# Patient Record
Sex: FEMALE | Race: ASIAN | NOT HISPANIC OR LATINO | Employment: FULL TIME | URBAN - METROPOLITAN AREA
[De-identification: names, ages, dates, MRNs, and addresses within clinical notes are randomized per-mention and may not be internally consistent; named-entity substitution may affect disease eponyms.]

---

## 2017-05-09 DIAGNOSIS — Z12.31 ENCOUNTER FOR SCREENING MAMMOGRAM FOR MALIGNANT NEOPLASM OF BREAST: ICD-10-CM

## 2017-11-01 ENCOUNTER — ALLSCRIPTS OFFICE VISIT (OUTPATIENT)
Dept: OTHER | Facility: OTHER | Age: 57
End: 2017-11-01

## 2017-11-01 DIAGNOSIS — E55.9 VITAMIN D DEFICIENCY: ICD-10-CM

## 2017-11-01 DIAGNOSIS — E78.5 HYPERLIPIDEMIA: ICD-10-CM

## 2017-12-15 ENCOUNTER — LAB CONVERSION - ENCOUNTER (OUTPATIENT)
Dept: OTHER | Facility: OTHER | Age: 57
End: 2017-12-15

## 2017-12-15 LAB
25(OH)D3 SERPL-MCNC: 24 NG/ML (ref 30–100)
A/G RATIO (HISTORICAL): 1.8 (CALC) (ref 1–2.5)
ALBUMIN SERPL BCP-MCNC: 4.3 G/DL (ref 3.6–5.1)
ALP SERPL-CCNC: 70 U/L (ref 33–130)
ALT SERPL W P-5'-P-CCNC: 13 U/L (ref 6–29)
AST SERPL W P-5'-P-CCNC: 14 U/L (ref 10–35)
BACTERIA UR QL AUTO: NORMAL /HPF
BASOPHILS # BLD AUTO: 1 %
BASOPHILS # BLD AUTO: 49 CELLS/UL (ref 0–200)
BILIRUB SERPL-MCNC: 0.5 MG/DL (ref 0.2–1.2)
BILIRUB UR QL STRIP: NEGATIVE
BUN SERPL-MCNC: 12 MG/DL (ref 7–25)
BUN/CREA RATIO (HISTORICAL): ABNORMAL (CALC) (ref 6–22)
CALCIUM SERPL-MCNC: 9.5 MG/DL (ref 8.6–10.4)
CHLORIDE SERPL-SCNC: 102 MMOL/L (ref 98–110)
CHOLEST SERPL-MCNC: 187 MG/DL
CHOLEST/HDLC SERPL: 2.6 (CALC)
CO2 SERPL-SCNC: 27 MMOL/L (ref 20–31)
COLOR UR: YELLOW
COMMENT (HISTORICAL): CLEAR
CREAT SERPL-MCNC: 0.7 MG/DL (ref 0.5–1.05)
CREATININE, RANDOM URINE (HISTORICAL): 20 MG/DL (ref 20–320)
DEPRECATED RDW RBC AUTO: 13.2 % (ref 11–15)
EGFR AFRICAN AMERICAN (HISTORICAL): 111 ML/MIN/1.73M2
EGFR-AMERICAN CALC (HISTORICAL): 96 ML/MIN/1.73M2
EOSINOPHIL # BLD AUTO: 113 CELLS/UL (ref 15–500)
EOSINOPHIL # BLD AUTO: 2.3 %
FECAL OCCULT BLOOD DIAGNOSTIC (HISTORICAL): NEGATIVE
GAMMA GLOBULIN (HISTORICAL): 2.4 G/DL (CALC) (ref 1.9–3.7)
GLUCOSE (HISTORICAL): 117 MG/DL (ref 65–99)
GLUCOSE (HISTORICAL): NEGATIVE
HBA1C MFR BLD HPLC: 6.6 % OF TOTAL HGB
HCT VFR BLD AUTO: 38.3 % (ref 35–45)
HDLC SERPL-MCNC: 73 MG/DL
HGB BLD-MCNC: 13 G/DL (ref 11.7–15.5)
HYALINE CASTS #/AREA URNS LPF: NORMAL /LPF
KETONES UR STRIP-MCNC: NEGATIVE MG/DL
LDL CHOLESTEROL (HISTORICAL): 94 MG/DL (CALC)
LEUKOCYTE ESTERASE UR QL STRIP: NEGATIVE
LYMPHOCYTES # BLD AUTO: 1872 CELLS/UL (ref 850–3900)
LYMPHOCYTES # BLD AUTO: 38.2 %
MAGNESIUM, UR (HISTORICAL): <0.2 MG/DL
MCH RBC QN AUTO: 30 PG (ref 27–33)
MCHC RBC AUTO-ENTMCNC: 33.9 G/DL (ref 32–36)
MCV RBC AUTO: 88.2 FL (ref 80–100)
MICROALBUMIN/CREATININE RATIO (HISTORICAL): NORMAL MCG/MG CREAT
MONOCYTES # BLD AUTO: 250 CELLS/UL (ref 200–950)
MONOCYTES (HISTORICAL): 5.1 %
NEUTROPHILS # BLD AUTO: 2617 CELLS/UL (ref 1500–7800)
NEUTROPHILS # BLD AUTO: 53.4 %
NITRITE UR QL STRIP: NEGATIVE
NON-HDL-CHOL (CHOL-HDL) (HISTORICAL): 114 MG/DL (CALC)
PH UR STRIP.AUTO: 7 [PH] (ref 5–8)
PLATELET # BLD AUTO: 273 THOUSAND/UL (ref 140–400)
PMV BLD AUTO: 11 FL (ref 7.5–12.5)
POTASSIUM SERPL-SCNC: 4 MMOL/L (ref 3.5–5.3)
PROT UR STRIP-MCNC: NEGATIVE MG/DL
RBC # BLD AUTO: 4.34 MILLION/UL (ref 3.8–5.1)
RBC (HISTORICAL): NORMAL /HPF
SODIUM SERPL-SCNC: 139 MMOL/L (ref 135–146)
SP GR UR STRIP.AUTO: 1 (ref 1–1.03)
SQUAMOUS EPITHELIAL CELLS (HISTORICAL): NORMAL /HPF
TOTAL PROTEIN (HISTORICAL): 6.7 G/DL (ref 6.1–8.1)
TRIGL SERPL-MCNC: 103 MG/DL
TSH SERPL DL<=0.05 MIU/L-ACNC: 3.15 MIU/L (ref 0.4–4.5)
WBC # BLD AUTO: 4.9 THOUSAND/UL (ref 3.8–10.8)
WBC # BLD AUTO: NORMAL /HPF

## 2018-01-10 NOTE — PROGRESS NOTES
Assessment    1  Annual physical exam (V70 0) (Z00 00)   2  Diabetes mellitus type II, controlled (250 00) (E11 9)   3  Hyperlipidemia (272 4) (E78 5)   4  Impaired fasting glucose (790 21) (R73 01)   5  Vitamin D deficiency (268 9) (E55 9)   6  Screening for genitourinary condition (V81 6) (Z13 89)    Plan  Diabetes mellitus type II, controlled, Vitamin D deficiency    · (1) HEMOGLOBIN A1C; Status:Active; Requested for:01Nov2017;   Hyperlipidemia, Vitamin D deficiency    · (1) LIPID PANEL FASTING W DIRECT LDL REFLEX; Status:Active; Requested  for:01Nov2017;    · (1) TSH WITH FT4 REFLEX; Status:Active; Requested for:01Nov2017;   Screening for genitourinary condition    · (1) URINALYSIS (will reflex a microscopy if leukocytes, occult blood, protein or nitrites  are not within normal limits); Status:Active; Requested for:01Nov2017;   Vitamin D deficiency    · (1) CBC/PLT/DIFF; Status:Active; Requested for:01Nov2017;    · (1) COMPREHENSIVE METABOLIC PANEL; Status:Active; Requested for:01Nov2017;    · (1) MICROALBUMIN CREATININE RATIO, RANDOM URINE; Status:Active; Requested  for:01Nov2017;    · (1) VITAMIN D 25-HYDROXY; Status:Active; Requested for:01Nov2017;     Discussion/Summary  healthy adult female Currently, she has an inadequate exercise regimen  cervical cancer screening is current cervical cancer screening is managed by [facility] Breast cancer screening: mammogram is current  Colorectal cancer screening: colonoscopy is needed every ten years and colorectal cancer screening is managed by Dr Sánchez Dias  Osteoporosis screening: bone mineral density testing is not indicated  The risks and benefits of immunizations were discussed  Advice and education were given regarding calcium supplements and vitamin D supplements  F/u in 1 yr  Chief Complaint  pt is here for annual physical      History of Present Illness  HM, Adult Female: The patient is being seen for a health maintenance evaluation  General Health:  The patient's health since the last visit is described as good  She has regular dental visits  She denies vision problems  She denies hearing loss  Immunizations status: up to date  Lifestyle:  She consumes a diverse and healthy diet  She does not have any weight concerns  She exercises regularly  She does not use tobacco  She denies alcohol use  Reproductive health:  she reports normal menses  Screening:      Review of Systems    Constitutional: not feeling poorly and not feeling tired  Eyes: eyesight problems  ENT: no nosebleeds, no sore throat and no hearing loss  Cardiovascular: no chest pain and no palpitations  Respiratory: no shortness of breath and no cough  Gastrointestinal: no abdominal pain, no nausea and no diarrhea  Genitourinary: no dysuria  Musculoskeletal: no arthralgias, no joint swelling and no joint stiffness  Neurological: no dizziness  Psychiatric: no anxiety and no depression  Hematologic/Lymphatic: no tendency for easy bleeding  Active Problems    1  Acute pharyngitis (462) (J02 9)   2  Colon cancer screening (V76 51) (Z12 11)   3  Diabetes mellitus type II, controlled (250 00) (E11 9)   4  Encounter for screening mammogram for breast cancer (V76 12) (Z12 31)   5  Hyperlipidemia (272 4) (E78 5)   6  Impaired fasting glucose (790 21) (R73 01)   7  Need for shingles vaccine (V04 89) (Z23)   8   Vitamin D deficiency (268 9) (E55 9)    Past Medical History    · History of Acute upper respiratory infection (465 9) (J06 9)   · History of Annual physical exam (V70 0) (Z00 00)   · History of Colonoscopy (Fiberoptic) Screening   · History of Diabetes mellitus type 2, uncontrolled (250 02) (E11 65)   · History of Encounter for screening colonoscopy (V76 51) (Z12 11)   · History of acute pharyngitis (V12 69) (Z87 09)   · History of dermatitis (V13 3) (Z87 2)   · History of migraine (V12 49) (Z86 69)   · Need for prophylactic vaccination and inoculation against influenza (V04 81) (Z23)   · History of Need for prophylactic vaccination and inoculation against influenza (V04 81)  (Z23)   · History of Vitamin D deficiency (268 9) (E55 9)    Surgical History    · History of Appendectomy    Family History  Mother    · Family history of Arthritis (V17 7)   · Denied: Family history of Colon cancer   · Denied: Family history of colitis   · Denied: Family history of colonic polyps   · Denied: Family history of Crohn's disease   · Denied: Family history of liver disease  Father    · Denied: Family history of Colon cancer   · Denied: Family history of colitis   · Denied: Family history of colonic polyps   · Denied: Family history of Crohn's disease   · Denied: Family history of liver disease   · Family history of Parkinson Disease    Social History    · Never A Smoker   · Wine Consumption (___ Glasses/Day)   · 1 glass/week    Current Meds   1  Atorvastatin Calcium 10 MG Oral Tablet; Take 1 tablet daily; Therapy: 12QDW3463 to (Last Rx:11Mut9638)  Requested for: 01Kpn2550 Ordered   2  Daily Multivitamin TABS; TAKE 1 TABLET DAILY; Therapy: (Recorded:31Oct2016) to Recorded   3  MetFORMIN HCl - 500 MG Oral Tablet; Take 1 tablet twice daily; Therapy: 93DNP0920 to (Evaluate:82Twa1388)  Requested for: 41MMH0089; Last   Rx:48Xqn4729 Ordered   4  OneTouch Delica Lancets Fine Miscellaneous; USE 3 TIMES A DAY; Therapy: 06CUG8541 to (Last Rx:72Zxl1447)  Requested for: 29UZP6285 Ordered   5  OneTouch Ultra Blue In [employer]; TEST three times a day; Therapy: 16VNV9220 to (Last Rx:11Uqi5195)  Requested for: 99Agt0609 Ordered    Allergies    1  No Known Drug Allergies    Vitals   Recorded: 04QAU7213 09:35AM   Heart Rate 64   Systolic 786   Diastolic 80   Height 5 ft 3 in   Weight 144 lb 4 00 oz   BMI Calculated 25 55   BSA Calculated 1 69   O2 Saturation 99     Physical Exam    Constitutional   General appearance: No acute distress, well appearing and well nourished      Head and Face   Head and face: Normal  Ears, Nose, Mouth, and Throat   External inspection of ears and nose: Normal     Otoscopic examination: Tympanic membranes translucent with normal light reflex  Canals patent without erythema  Oropharynx: Normal with no erythema, edema, exudate or lesions  Neck   Neck: Supple, symmetric, trachea midline, no masses  Thyroid: Normal, no thyromegaly  Pulmonary   Respiratory effort: No increased work of breathing or signs of respiratory distress  Auscultation of lungs: Clear to auscultation  Cardiovascular   Auscultation of heart: Normal rate and rhythm, normal S1 and S2, no murmurs  Carotid pulses: 2+ bilaterally  Pedal pulses: 2+ bilaterally  Examination of extremities for edema and/or varicosities: Normal     Abdomen   Abdomen: Non-tender, no masses  Genitourinary gyn  Lymphatic   Palpation of lymph nodes in neck: No lymphadenopathy  Palpation of lymph nodes in axillae: No lymphadenopathy  Musculoskeletal   Gait and station: Normal     Digits and nails: Normal without clubbing or cyanosis  Joints, bones, and muscles: Normal     Stability: Normal     Muscle strength/tone: Normal     Neurologic   Cranial nerves: Cranial nerves II-XII intact  Cortical function: Normal mental status  Reflexes: 2+ and symmetric  Coordination: Normal finger to nose and heel to shin  Psychiatric   Judgment and insight: Normal     Orientation to person, place, and time: Normal     Recent and remote memory: Intact  Health Management  Colon cancer screening   COLONOSCOPY (GI, SURG); every 10 years; Last 28GWN3114; Next Due: 79LCI0309; Active    Future Appointments    Date/Time Provider Specialty Site   11/02/2018 09:00 AM CORNELIUS Schultz   Internal Medicine [facility]     Signatures   Electronically signed by : CORNELIUS Ramirez ; Nov 11 2017 10:59PM EST                       (Author)

## 2018-01-13 VITALS
WEIGHT: 144.25 LBS | DIASTOLIC BLOOD PRESSURE: 80 MMHG | BODY MASS INDEX: 25.56 KG/M2 | HEART RATE: 64 BPM | SYSTOLIC BLOOD PRESSURE: 128 MMHG | OXYGEN SATURATION: 99 % | HEIGHT: 63 IN

## 2018-01-14 NOTE — PROGRESS NOTES
Chief Complaint  pt was in for Zostavax vaccine was administered in [facility] [id number]  Lot [id number]  exp 02/17/2017      Active Problems    1  Acute upper respiratory infection (465 9) (J06 9)   2  Colonoscopy (Fiberoptic) Screening   3  Dermatitis (692 9) (L30 9)   4  Encounter for screening colonoscopy (V76 51) (Z12 11)   5  Hyperlipidemia (272 4) (E78 5)   6  Impaired fasting glucose (790 21) (R73 01)   7  Migraine headache (346 90) (G43 909)   8  Vitamin D deficiency (268 9) (E55 9)    Current Meds   1  Atorvastatin Calcium 10 MG Oral Tablet; Take 1 tablet daily; Therapy: 56RDT6790 to (Last Rx:16Oct2015)  Requested for: 16Oct2015 Ordered   2  DrRx Zithromax Z-Tho 250 MG #6 pill pack; TAKE TWO PILLS FIRST DAY AND THEN   ONE FOR THE NEXT FOUR DAYS AFTER; Therapy: 68VXS5044 to (Last Rx:29Vtw3895) Ordered   3  Guaifenesin-Codeine 100-10 MG/5ML SYRP; one teaspoonsful twice a day prn; Therapy: 11MVS0498 to (Last Rx:89Viu7401) Ordered   4  MetFORMIN HCl - 500 MG Oral Tablet; Take one tablet daily with breakfast;   Therapy: 11VXR3358 to (Last Rx:16Oct2015)  Requested for: 07NYF0057 Ordered   5  Naratriptan HCl - 1 MG Oral Tablet; TAKE 1 TABLET AT ONSET OF HEADACHE, MAY   REPEAT ONCE IN 4 HOURS; Therapy: 81QMB3227 to ([place] [facility])  Requested for: 48THP9869; Last   Rx:17Lon6952 Ordered   6  Triamcinolone Acetonide 0 1 % External Lotion; APPLY SPARINGLY AND MASSAGE IN   TWICE DAILY; Therapy: 56USZ9770 to (Last Rx:14Nov2014)  Requested for: 87YWP6355 Ordered   7  Vitamin D3 46640 UNIT Oral Capsule; one pill  q week x 2 months; Therapy: 28MFJ7023 to (Last Rx:10Mar2014)  Requested for: 71VJH7739 Ordered    Allergies    1  No Known Drug Allergies    Plan  Need for shingles vaccine    · Zoster (Zostavax); INJECT 0 65  ML Intramuscular;  To Be Done: 36PFL7342    Signatures   Electronically signed by : CORNELIUS Freitas ; Aug  5 2016  9:48AM EST                       (Author)

## 2018-01-16 NOTE — PROGRESS NOTES
Assessment    1  Annual physical exam (V70 0) (Z00 00)   2  Impaired fasting glucose (790 21) (R73 01)   3  Hyperlipidemia (272 4) (E78 5)   4  Vitamin D deficiency (268 9) (E55 9)   5  Encounter for screening mammogram for breast cancer (V76 12) (Z12 31)    Plan  Encounter for screening colonoscopy    · Sofi Posada DO (Gastroenterology) Physician Referral  Consult  Status: Active   Requested for: 14KIT6279  Care Summary provided  : Yes  Encounter for screening mammogram for breast cancer    · COLONOSCOPY; Status:Active; Requested for:75Uuo2731;    · MAMMO SCREENING BILATERAL W 3D & CAD; Status:Hold For - Scheduling; Requested  for:61Ygf7483;   Hyperlipidemia    · (1) COMPREHENSIVE METABOLIC PANEL; Status:Active; Requested for:38Nka0964;    · (1) LIPID PANEL FASTING W DIRECT LDL REFLEX; Status:Active; Requested  for:21Qew8149;    · (1) VITAMIN D 25-HYDROXY; Status:Active; Requested for:85Avf2153;   Hyperlipidemia, Impaired fasting glucose    · (1) CBC/PLT/DIFF; Status:Active; Requested for:15Zsa2958;    · (1) TSH WITH FT4 REFLEX; Status:Active; Requested for:14Ybx0253;    · (1) URINALYSIS w URINE C/S REFLEX (will reflex a microscopy if leukocytes, occult  blood, or nitrites are not within normal limits); Status:Active; Requested for:80Bei8605;   Hyperlipidemia, Vitamin D deficiency    · (1) LIPID PANEL FASTING W DIRECT LDL REFLEX; Status:Active; Requested  for:92Rul3803;   Impaired fasting glucose, Vitamin D deficiency    · (1) HEMOGLOBIN A1C; Status:Active; Requested for:70Lot2633;   Vitamin D deficiency    · (1) CBC/PLT/DIFF; Status:Active; Requested for:21Goe2948;    · (1) COMPREHENSIVE METABOLIC PANEL; Status:Active; Requested for:11Rxa9013;    · (1) TSH WITH FT4 REFLEX; Status:Active; Requested for:13Saw7020;    · (1) URINALYSIS w URINE C/S REFLEX (will reflex a microscopy if leukocytes, occult  blood, or nitrites are not within normal limits); Status:Active;  Requested for:25Tso4062;    · (1) VITAMIN D 25-HYDROXY; Status:Active; Requested for:91Pbv4581;     Discussion/Summary  health maintenance visit Currently, she has an inadequate exercise regimen  cervical cancer screening is current Breast cancer screening: mammogram is current and breast cancer screening is managed by gyn  Colorectal cancer screening: colonoscopy has been ordered  Osteoporosis screening: bone mineral density testing is not indicated  Screening lab work includes see orders  The immunizations are up to date  Patient discussion: discussed with the patient  History of Present Illness  HM, Adult Female: The patient is being seen for a health maintenance evaluation  General Health: The patient's health since the last visit is described as good  Screening:      Review of Systems    Constitutional: not feeling poorly and not feeling tired  Eyes: no eyesight problems and no purulent discharge from the eyes  ENT: no nosebleeds and no nasal discharge  Cardiovascular: no chest pain and no palpitations  Respiratory: no cough and no shortness of breath during exertion  Active Problems    1  Acute upper respiratory infection (465 9) (J06 9)   2  Colonoscopy (Fiberoptic) Screening   3  Dermatitis (692 9) (L30 9)   4  Encounter for screening colonoscopy (V76 51) (Z12 11)   5  Encounter for screening mammogram for breast cancer (V76 12) (Z12 31)   6  Hyperlipidemia (272 4) (E78 5)   7  Impaired fasting glucose (790 21) (R73 01)   8  Migraine headache (346 90) (G43 909)   9  Need for shingles vaccine (V04 89) (Z23)   10   Vitamin D deficiency (268 9) (E55 9)    Past Medical History    · History of acute pharyngitis (V12 69) (Z87 09)   · Need for prophylactic vaccination and inoculation against influenza (V04 81) (Z23)   · History of Need for prophylactic vaccination and inoculation against influenza (V04 81)  (Z23)   · History of Vitamin D deficiency (268 9) (E55 9)    Surgical History    · History of Appendectomy    Family History  Mother    · Family history of Arthritis (V17 7)  Father    · Family history of Parkinson Disease    Social History    · Never A Smoker   · Wine Consumption (___ Glasses/Day)   · 1 glass/week    Current Meds   1  Atorvastatin Calcium 10 MG Oral Tablet; Take 1 tablet daily; Therapy: 64SMA7461 to (Last Rx:16Oct2015)  Requested for: 16Oct2015 Ordered   2  DrRx Zithromax Z-Tho 250 MG #6 pill pack; TAKE TWO PILLS FIRST DAY AND THEN   ONE FOR THE NEXT FOUR DAYS AFTER; Therapy: 16JNU8312 to (Last Rx:20Ddh4372) Ordered   3  Guaifenesin-Codeine 100-10 MG/5ML SYRP; one teaspoonsful twice a day prn; Therapy: 27RBW4078 to (Last Rx:77Eyn3713) Ordered   4  MetFORMIN HCl - 500 MG Oral Tablet; Take one tablet daily with breakfast;   Therapy: 38AVD2816 to (Last Rx:16Oct2015)  Requested for: 43HGP0024 Ordered   5  Naratriptan HCl - 1 MG Oral Tablet; TAKE 1 TABLET AT ONSET OF HEADACHE, MAY   REPEAT ONCE IN 4 HOURS; Therapy: 77NHQ2098 to (Nory Henry)  Requested for: 36SWK4099; Last   Rx:96Ove7304 Ordered   6  Triamcinolone Acetonide 0 1 % External Lotion; APPLY SPARINGLY AND MASSAGE IN   TWICE DAILY; Therapy: 65DHF4427 to (Last Rx:14Nov2014)  Requested for: 06NUN6888 Ordered   7  Vitamin D3 71684 UNIT Oral Capsule; one pill  q week x 2 months; Therapy: 39WVZ7763 to (Last Rx:10Mar2014)  Requested for: 71GRL6890 Ordered    Allergies    1  No Known Drug Allergies    Vitals   Recorded: 55YSH2696 26:62CR   Systolic 265   Diastolic 76   Heart Rate 75   Respiration 16   O2 Saturation 97   Height 5 ft 3 in   Weight 148 lb    BMI Calculated 26 22   BSA Calculated 1 71     Physical Exam    Constitutional   General appearance: No acute distress, well appearing and well nourished  Head and Face   Head and face: Normal     Ears, Nose, Mouth, and Throat   External inspection of ears and nose: Normal     Otoscopic examination: Tympanic membranes translucent with normal light reflex  Canals patent without erythema  Oropharynx: Normal with no erythema, edema, exudate or lesions  Neck   Neck: Supple, symmetric, trachea midline, no masses  Thyroid: Normal, no thyromegaly  Pulmonary   Respiratory effort: No increased work of breathing or signs of respiratory distress  Auscultation of lungs: Clear to auscultation  Cardiovascular   Auscultation of heart: Normal rate and rhythm, normal S1 and S2, no murmurs  Carotid pulses: 2+ bilaterally  Pedal pulses: 2+ bilaterally  Examination of extremities for edema and/or varicosities: Normal     Abdomen   Abdomen: Non-tender, no masses  Genitourinary gyn  Lymphatic   Palpation of lymph nodes in neck: No lymphadenopathy  Palpation of lymph nodes in axillae: No lymphadenopathy  Musculoskeletal   Gait and station: Normal     Digits and nails: Normal without clubbing or cyanosis  Joints, bones, and muscles: Normal     Stability: Normal     Muscle strength/tone: Normal     Neurologic   Cranial nerves: Cranial nerves II-XII intact  Cortical function: Normal mental status  Reflexes: 2+ and symmetric  Coordination: Normal finger to nose and heel to shin  Psychiatric   Judgment and insight: Normal     Orientation to person, place, and time: Normal     Recent and remote memory: Intact  Health Management  Encounter for screening colonoscopy   COLONOSCOPY; every 10 years; Next Due: 41EQQ1601; Overdue    Future Appointments    Date/Time Provider Specialty Site   08/17/2017 02:20 PM CORNELIUS Manuel   Internal Medicine [facility]     Signatures   Electronically signed by : CORNELIUS Bartholomew ; Aug 19 2016 11:42PM EST                       (Author)

## 2018-04-01 DIAGNOSIS — E11.9 TYPE 2 DIABETES MELLITUS WITHOUT COMPLICATION, WITHOUT LONG-TERM CURRENT USE OF INSULIN (HCC): Primary | ICD-10-CM

## 2018-04-09 ENCOUNTER — DOCUMENTATION (OUTPATIENT)
Dept: INTERNAL MEDICINE CLINIC | Facility: CLINIC | Age: 58
End: 2018-04-09

## 2018-04-18 ENCOUNTER — HOSPITAL ENCOUNTER (EMERGENCY)
Facility: HOSPITAL | Age: 58
Discharge: HOME | End: 2018-04-18
Attending: EMERGENCY MEDICINE
Payer: COMMERCIAL

## 2018-04-18 VITALS
BODY MASS INDEX: 25.69 KG/M2 | OXYGEN SATURATION: 97 % | RESPIRATION RATE: 18 BRPM | WEIGHT: 145 LBS | TEMPERATURE: 97.4 F | SYSTOLIC BLOOD PRESSURE: 135 MMHG | DIASTOLIC BLOOD PRESSURE: 63 MMHG | HEART RATE: 64 BPM

## 2018-04-18 DIAGNOSIS — R10.13 DYSPEPSIA: Primary | ICD-10-CM

## 2018-04-18 DIAGNOSIS — R10.12 LUQ PAIN: ICD-10-CM

## 2018-04-18 DIAGNOSIS — K29.70 GASTRITIS: ICD-10-CM

## 2018-04-18 LAB
ALBUMIN SERPL BCP-MCNC: 4.1 G/DL (ref 3.5–5)
ALP SERPL-CCNC: 75 U/L (ref 46–116)
ALT SERPL W P-5'-P-CCNC: 24 U/L (ref 12–78)
ANION GAP SERPL CALCULATED.3IONS-SCNC: 13 MMOL/L (ref 4–13)
AST SERPL W P-5'-P-CCNC: 30 U/L (ref 5–45)
BASOPHILS # BLD AUTO: 0.02 THOUSANDS/ΜL (ref 0–0.1)
BASOPHILS NFR BLD AUTO: 0 % (ref 0–1)
BILIRUB SERPL-MCNC: 0.47 MG/DL (ref 0.2–1)
BUN SERPL-MCNC: 14 MG/DL (ref 5–25)
CALCIUM SERPL-MCNC: 9.4 MG/DL (ref 8.3–10.1)
CHLORIDE SERPL-SCNC: 101 MMOL/L (ref 100–108)
CO2 SERPL-SCNC: 26 MMOL/L (ref 21–32)
CREAT SERPL-MCNC: 0.93 MG/DL (ref 0.6–1.3)
EOSINOPHIL # BLD AUTO: 0.01 THOUSAND/ΜL (ref 0–0.61)
EOSINOPHIL NFR BLD AUTO: 0 % (ref 0–6)
ERYTHROCYTE [DISTWIDTH] IN BLOOD BY AUTOMATED COUNT: 13.2 % (ref 11.6–15.1)
GFR SERPL CREATININE-BSD FRML MDRD: 68 ML/MIN/1.73SQ M
GLUCOSE SERPL-MCNC: 234 MG/DL (ref 65–140)
HCT VFR BLD AUTO: 38.1 % (ref 34.8–46.1)
HGB BLD-MCNC: 13 G/DL (ref 11.5–15.4)
LIPASE SERPL-CCNC: 122 U/L (ref 73–393)
LYMPHOCYTES # BLD AUTO: 1.24 THOUSANDS/ΜL (ref 0.6–4.47)
LYMPHOCYTES NFR BLD AUTO: 15 % (ref 14–44)
MCH RBC QN AUTO: 29.7 PG (ref 26.8–34.3)
MCHC RBC AUTO-ENTMCNC: 34.1 G/DL (ref 31.4–37.4)
MCV RBC AUTO: 87 FL (ref 82–98)
MONOCYTES # BLD AUTO: 0.22 THOUSAND/ΜL (ref 0.17–1.22)
MONOCYTES NFR BLD AUTO: 3 % (ref 4–12)
NEUTROPHILS # BLD AUTO: 6.74 THOUSANDS/ΜL (ref 1.85–7.62)
NEUTS SEG NFR BLD AUTO: 82 % (ref 43–75)
NRBC BLD AUTO-RTO: 0 /100 WBCS
PLATELET # BLD AUTO: 266 THOUSANDS/UL (ref 149–390)
PMV BLD AUTO: 10.6 FL (ref 8.9–12.7)
POTASSIUM SERPL-SCNC: 4.8 MMOL/L (ref 3.5–5.3)
PROT SERPL-MCNC: 7.9 G/DL (ref 6.4–8.2)
RBC # BLD AUTO: 4.37 MILLION/UL (ref 3.81–5.12)
SODIUM SERPL-SCNC: 140 MMOL/L (ref 136–145)
TROPONIN I SERPL-MCNC: <0.02 NG/ML
WBC # BLD AUTO: 8.23 THOUSAND/UL (ref 4.31–10.16)

## 2018-04-18 PROCEDURE — 96361 HYDRATE IV INFUSION ADD-ON: CPT

## 2018-04-18 PROCEDURE — 83690 ASSAY OF LIPASE: CPT | Performed by: EMERGENCY MEDICINE

## 2018-04-18 PROCEDURE — 36415 COLL VENOUS BLD VENIPUNCTURE: CPT | Performed by: EMERGENCY MEDICINE

## 2018-04-18 PROCEDURE — 84484 ASSAY OF TROPONIN QUANT: CPT | Performed by: EMERGENCY MEDICINE

## 2018-04-18 PROCEDURE — 96376 TX/PRO/DX INJ SAME DRUG ADON: CPT

## 2018-04-18 PROCEDURE — 80053 COMPREHEN METABOLIC PANEL: CPT | Performed by: EMERGENCY MEDICINE

## 2018-04-18 PROCEDURE — 96374 THER/PROPH/DIAG INJ IV PUSH: CPT

## 2018-04-18 PROCEDURE — 85025 COMPLETE CBC W/AUTO DIFF WBC: CPT | Performed by: EMERGENCY MEDICINE

## 2018-04-18 PROCEDURE — 99284 EMERGENCY DEPT VISIT MOD MDM: CPT

## 2018-04-18 PROCEDURE — 93005 ELECTROCARDIOGRAM TRACING: CPT | Performed by: EMERGENCY MEDICINE

## 2018-04-18 PROCEDURE — 96375 TX/PRO/DX INJ NEW DRUG ADDON: CPT

## 2018-04-18 RX ORDER — MAGNESIUM HYDROXIDE/ALUMINUM HYDROXICE/SIMETHICONE 120; 1200; 1200 MG/30ML; MG/30ML; MG/30ML
30 SUSPENSION ORAL ONCE
Status: COMPLETED | OUTPATIENT
Start: 2018-04-18 | End: 2018-04-18

## 2018-04-18 RX ORDER — DICYCLOMINE HCL 20 MG
20 TABLET ORAL ONCE
Status: COMPLETED | OUTPATIENT
Start: 2018-04-18 | End: 2018-04-18

## 2018-04-18 RX ORDER — ACETAMINOPHEN 325 MG/1
650 TABLET ORAL EVERY 6 HOURS PRN
Qty: 30 TABLET | Refills: 0 | Status: SHIPPED | OUTPATIENT
Start: 2018-04-18

## 2018-04-18 RX ORDER — SUCRALFATE ORAL 1 G/10ML
1000 SUSPENSION ORAL ONCE
Status: COMPLETED | OUTPATIENT
Start: 2018-04-18 | End: 2018-04-18

## 2018-04-18 RX ORDER — ONDANSETRON 2 MG/ML
INJECTION INTRAMUSCULAR; INTRAVENOUS
Status: COMPLETED
Start: 2018-04-18 | End: 2018-04-18

## 2018-04-18 RX ORDER — MAGNESIUM HYDROXIDE/ALUMINUM HYDROXICE/SIMETHICONE 120; 1200; 1200 MG/30ML; MG/30ML; MG/30ML
15 SUSPENSION ORAL ONCE
Status: COMPLETED | OUTPATIENT
Start: 2018-04-18 | End: 2018-04-18

## 2018-04-18 RX ORDER — DICYCLOMINE HCL 20 MG
20 TABLET ORAL 2 TIMES DAILY
Qty: 20 TABLET | Refills: 0 | Status: SHIPPED | OUTPATIENT
Start: 2018-04-18

## 2018-04-18 RX ORDER — ONDANSETRON 2 MG/ML
4 INJECTION INTRAMUSCULAR; INTRAVENOUS ONCE
Status: COMPLETED | OUTPATIENT
Start: 2018-04-18 | End: 2018-04-18

## 2018-04-18 RX ORDER — RANITIDINE 150 MG/1
150 CAPSULE ORAL DAILY
Qty: 30 CAPSULE | Refills: 0 | Status: SHIPPED | OUTPATIENT
Start: 2018-04-18

## 2018-04-18 RX ADMIN — ONDANSETRON 4 MG: 2 INJECTION INTRAMUSCULAR; INTRAVENOUS at 19:27

## 2018-04-18 RX ADMIN — ALUMINUM HYDROXIDE, MAGNESIUM HYDROXIDE, AND SIMETHICONE 30 ML: 200; 200; 20 SUSPENSION ORAL at 19:26

## 2018-04-18 RX ADMIN — LIDOCAINE HYDROCHLORIDE 10 ML: 20 SOLUTION ORAL; TOPICAL at 20:31

## 2018-04-18 RX ADMIN — SODIUM CHLORIDE 1000 ML: 0.9 INJECTION, SOLUTION INTRAVENOUS at 19:25

## 2018-04-18 RX ADMIN — DICYCLOMINE HYDROCHLORIDE 20 MG: 20 TABLET ORAL at 19:25

## 2018-04-18 RX ADMIN — SUCRALFATE 1000 MG: 1 SUSPENSION ORAL at 20:30

## 2018-04-18 RX ADMIN — FAMOTIDINE 20 MG: 10 INJECTION, SOLUTION INTRAVENOUS at 20:25

## 2018-04-18 RX ADMIN — LIDOCAINE HYDROCHLORIDE 15 ML: 20 SOLUTION ORAL; TOPICAL at 19:26

## 2018-04-18 RX ADMIN — ONDANSETRON 4 MG: 2 INJECTION INTRAMUSCULAR; INTRAVENOUS at 20:22

## 2018-04-18 RX ADMIN — ALUMINUM HYDROXIDE, MAGNESIUM HYDROXIDE, AND SIMETHICONE 15 ML: 200; 200; 20 SUSPENSION ORAL at 20:30

## 2018-04-18 NOTE — ED PROVIDER NOTES
Final Diagnosis:  1  Dyspepsia    2  Gastritis    3  LUQ pain      Chief Complaint   Patient presents with    Abdominal Pain     patient reports diffuse abdominal burning and cramping since early this afternoon with nausea and vomiting  denies diarrhea and constipation   Syncope     patient had syncopal episode in triage  This is a 40-year-old female presenting for evaluation of epigastric discomfort  The patient states that her  continue be ill with a new chili powder  Both them had a meal however soon after the patient ingested that she started to have a diffuse upper belly burning sensation with cramping sensation as well  She felt very nauseous, vomited several times, nonbloody nonbilious  She feels very dizzy because of this  She feels lightheaded like she is going to fall over  Denies any vertigo or headache  Denies fevers chills chest pain shortness of breath  Denies arm pain jaw pain back pain  Denies lower extremity pain or swelling  Denies urinary symptoms including burning itching pain blood frequency  Denies cough congestion rhinorrhea sore throat  Denies sick contacts with similar  Denies recent travel outside the state or country  Denies new medications  She is on metformin for pre diabetes  She feels her upper belly is "burning" and "cramping"  I was called to evaluate her immediately b/c of a syncopal episode in the waiting room, falling over, no head trauma  PMH:  - pDM on metformin  PSH:  - appendectomy  No smoking  Occasional alcohol  No drugs  PE:   Vitals:    04/18/18 1859 04/18/18 2101   BP: 135/60 135/63   BP Location: Right arm    Pulse: 61 64   Resp: 15 18   Temp: (!) 97 4 °F (36 3 °C)    TempSrc: Oral    SpO2: 100% 97%   Weight: 65 8 kg (145 lb)    General: VSS, NAD, awake, alert  Well-nourished, well-developed  Appears stated age  Head: Normocephalic, atraumatic, nontender  Eyes: PERRL, EOM-I  No diplopia  No hyphema     No subconjunctival hemorrhages  Symmetrical lids  ENT: Atraumatic external nose and ears  MMM  No malocclusion  No stridor  Normal phonation  No drooling  Normal swallowing  Base of mouth is soft  No mastoid tenderness  Neck: Symmetric, trachea midline  No JVD  No midline neck tenderness  CV: RRR  +S1/S2  No murmurs or gallops  Peripheral pulses +2 throughout  No chest wall tenderness  Lungs:   Unlabored No retractions  CTAB, lungs sounds equal bilateral    No crepitus  No tachypnea  No paradoxical motion  Abd: +BS, soft  Epigastric pain/tenderness  RUQ tenderness  ND   No guarding  No rigidity  No rebound  No hepatosplenomegaly noted  No peritoneal signs  Psoas/obturator/heel strike signs are absent  MSK:   FROM   No lower extremity edema  Back:   No C/T/L-spine tenderness  No CVAT  Skin: Dry, intact  Neuro: AAOx3, GCS 15, CN II-XII grossly intact  Motor grossly intact  Psychiatric/Behavioral: Appropriate mood and affect   Exam: deferred  A:  - Epigastric pain  - Nausea  - near syncope  P:  - Patient is not high-risk for syncope as the patient has no high risk criteria including: CHF hx, hematocrit<30%, Abnormal EKG (new EKG changes from any source, any non-sinus rhythm or monitoring), SOB symptoms, systolic BP < 90 at triage  So, patient IS in the low-risk group for serious outcome  - Cardiac workup  - Symptomatic measures  - if no improvement, discuss CT  - 13 point ROS was performed and all are normal unless stated in the history above  - Nursing note reviewed  Vitals reviewed  - Orders placed by myself and/or advanced practitioner / resident     - Previous chart was not reviewed  - No language barrier    - History obtained from  patient  - There are no limitations to the history obtained  - Critical care time: Not applicable for this patient  ED Course as of Apr 18 2311 Wed Apr 18, 2018   1921 This EKG was interpreted by me   The EKG demonstrates Normal sinus rhythm, normal intervals and axis, normal QRS, non specific acute ST-T changes  HR 58   No old  2001 Labs normal  Re-evaluate, dispo  2007 Patient feelingImproved  I reviewed that her blood work did not demonstrate any signs of heart attack or strain  She has no signs of cholecystitis or lab work  Will trial IV Pepcid, repeat Reglan p r n , GI cocktail again and re-evaluate  2022 Patient feeling nauseous  Ordered zofran  2126 Patient symptomatically improved  Minimal LUQ pain  Reviewed oral return precautions  Reviewed reasons to come back for imaging  Reviewed dietary restrictions  Reviewed what medications we will use for the discomfort for home use  Patient okay with the plan         Medications   sodium chloride 0 9 % bolus 1,000 mL (0 mL Intravenous Stopped 4/18/18 2025)   ondansetron (ZOFRAN) injection 4 mg (4 mg Intravenous Given 4/18/18 1927)   dicyclomine (BENTYL) tablet 20 mg (20 mg Oral Given 4/18/18 1925)   aluminum-magnesium hydroxide-simethicone (MYLANTA) 200-200-20 mg/5 mL oral suspension 30 mL (30 mL Oral Given 4/18/18 1926)   lidocaine viscous (XYLOCAINE) 2 % mucosal solution 15 mL (15 mL Swish & Swallow Given 4/18/18 1926)   aluminum-magnesium hydroxide-simethicone (MYLANTA) 200-200-20 mg/5 mL oral suspension 15 mL (15 mL Oral Given 4/18/18 2030)   sucralfate (CARAFATE) oral suspension 1,000 mg (1,000 mg Oral Given 4/18/18 2030)   lidocaine viscous (XYLOCAINE) 2 % mucosal solution 10 mL (10 mL Swish & Swallow Given 4/18/18 2031)   famotidine (PEPCID) injection 20 mg (20 mg Intravenous Given 4/18/18 2025)   ondansetron (ZOFRAN) injection 4 mg (4 mg Intravenous Given 4/18/18 2022)     No orders to display     Orders Placed This Encounter   Procedures    CBC and differential    Comprehensive metabolic panel    Lipase    Troponin I    Insert peripheral IV    ECG 12 lead     Labs Reviewed   CBC AND DIFFERENTIAL - Abnormal        Result Value Ref Range Status    WBC 8 23  4 31 - 10 16 Thousand/uL Final    RBC 4 37  3 81 - 5 12 Million/uL Final    Hemoglobin 13 0  11 5 - 15 4 g/dL Final    Hematocrit 38 1  34 8 - 46 1 % Final    MCV 87  82 - 98 fL Final    MCH 29 7  26 8 - 34 3 pg Final    MCHC 34 1  31 4 - 37 4 g/dL Final    RDW 13 2  11 6 - 15 1 % Final    MPV 10 6  8 9 - 12 7 fL Final    Platelets 961  143 - 390 Thousands/uL Final    nRBC 0  /100 WBCs Final    Neutrophils Relative 82 (*) 43 - 75 % Final    Lymphocytes Relative 15  14 - 44 % Final    Monocytes Relative 3 (*) 4 - 12 % Final    Eosinophils Relative 0  0 - 6 % Final    Basophils Relative 0  0 - 1 % Final    Neutrophils Absolute 6 74  1 85 - 7 62 Thousands/µL Final    Lymphocytes Absolute 1 24  0 60 - 4 47 Thousands/µL Final    Monocytes Absolute 0 22  0 17 - 1 22 Thousand/µL Final    Eosinophils Absolute 0 01  0 00 - 0 61 Thousand/µL Final    Basophils Absolute 0 02  0 00 - 0 10 Thousands/µL Final   COMPREHENSIVE METABOLIC PANEL - Abnormal     Sodium 140  136 - 145 mmol/L Final    Potassium 4 8  3 5 - 5 3 mmol/L Final    Comment: Slightly Hemolyzed; Results May be Affected    Chloride 101  100 - 108 mmol/L Final    CO2 26  21 - 32 mmol/L Final    Anion Gap 13  4 - 13 mmol/L Final    BUN 14  5 - 25 mg/dL Final    Creatinine 0 93  0 60 - 1 30 mg/dL Final    Comment: Standardized to IDMS reference method    Glucose 234 (*) 65 - 140 mg/dL Final    Comment:   If the patient is fasting, the ADA then defines impaired fasting glucose as > 100 mg/dL and diabetes as > or equal to 123 mg/dL  Specimen collection should occur prior to Sulfasalazine administration due to the potential for falsely depressed results  Specimen collection should occur prior to Sulfapyridine administration due to the potential for falsely elevated results  Calcium 9 4  8 3 - 10 1 mg/dL Final    AST 30  5 - 45 U/L Final    Comment: Slightly Hemolyzed;  Results May be Affected  Specimen collection should occur prior to Sulfasalazine administration due to the potential for falsely depressed results  ALT 24  12 - 78 U/L Final    Comment:   Specimen collection should occur prior to Sulfasalazine administration due to the potential for falsely depressed results  Alkaline Phosphatase 75  46 - 116 U/L Final    Total Protein 7 9  6 4 - 8 2 g/dL Final    Albumin 4 1  3 5 - 5 0 g/dL Final    Total Bilirubin 0 47  0 20 - 1 00 mg/dL Final    eGFR 68  ml/min/1 73sq m Final    Narrative:     National Kidney Disease Education Program recommendations are as follows:  GFR calculation is accurate only with a steady state creatinine  Chronic Kidney disease less than 60 ml/min/1 73 sq  meters  Kidney failure less than 15 ml/min/1 73 sq  meters  LIPASE - Normal    Lipase 122  73 - 393 u/L Final   TROPONIN I - Normal    Troponin I <0 02  <=0 04 ng/mL Final    Comment: 3Autovalidation override    Narrative:     [employer] Chemistry analyzer 99% cutoff is > 0 04 ng/mL in network labs    o cTnI 99% cutoff is useful only when applied to patients in the clinical setting of myocardial ischemia  o cTnI 99% cutoff should be interpreted in the context of clinical history, ECG findings and possibly cardiac imaging to establish correct diagnosis  o cTnI 99% cutoff may be suggestive but clearly not indicative of a coronary event without the clinical setting of myocardial ischemia  Time reflects when diagnosis was documented in both MDM as applicable and the Disposition within this note     Time User Action Codes Description Comment    4/18/2018  9:32 PM Malian Bridges Add [R10 13] Dyspepsia     4/18/2018  9:32 PM Malian Bridges Add [K29 70] Gastritis     4/18/2018  9:32 PM Bita Collin [R10 12] LUQ pain       ED Disposition     ED Disposition Condition Comment    Discharge  Francia [address] discharge to home/self care      Condition at discharge: Good        Follow-up Information     Follow up With Specialties Details Why Contact Info Additional Information    [facility]GHESE [facility]IZZO  [facility] Emergency Department Emergency Medicine Go to If symptoms worsen [address] [facility]  [phone] [place] ED, [address]  , [place], [place], [address]    Ravinder Lujan MD Internal Medicine Call in 1 day To make appointment for re-evaluation in 1 week [address] [facility]           Discharge Medication List as of 4/18/2018  9:33 PM      START taking these medications    Details   acetaminophen (TYLENOL) 325 mg tablet Take 2 tablets (650 mg total) by mouth every 6 (six) hours as needed for mild pain or fever, Starting Wed 4/18/2018, Print      dicyclomine (BENTYL) 20 mg tablet Take 1 tablet (20 mg total) by mouth 2 (two) times a day, Starting Wed 4/18/2018, Print      ranitidine (ZANTAC) 150 MG capsule Take 1 capsule (150 mg total) by mouth daily, Starting Wed 4/18/2018, Print         CONTINUE these medications which have NOT CHANGED    Details   atorvastatin (LIPITOR) 10 mg tablet Take 10 mg by mouth daily, Until Discontinued, Historical Med      metFORMIN (GLUCOPHAGE) 500 mg tablet TAKE 1 TABLET TWICE A DAY, Normal           No discharge procedures on file  Prior to Admission Medications   Prescriptions Last Dose Informant Patient Reported? Taking?   atorvastatin (LIPITOR) 10 mg tablet   Yes No   Sig: Take 10 mg by mouth daily   metFORMIN (GLUCOPHAGE) 500 mg tablet   No No   Sig: TAKE 1 TABLET TWICE A DAY      Facility-Administered Medications: None       Portions of the record may have been created with voice recognition software  Occasional wrong word or "sound a like" substitutions may have occurred due to the inherent limitations of voice recognition software  Read the chart carefully and recognize, using context, where substitutions have occurred      Electronically signed by:  Donavon Lebron MD  04/18/18 1931

## 2018-04-18 NOTE — ED NOTES
Patient had syncopal episode in triage while standing from the chair,  right next to patient initially grabbed patient, RN assisted to sit patient back down in chair  Patient out for approximately 30 seconds  Upon waking reported dizziness and lightheadedness  Patient pale  Patient assisted to wheelchair        Kamaljit Phillips RN  04/18/18 3218

## 2018-04-19 LAB
ATRIAL RATE: 62 BPM
P AXIS: 93 DEGREES
PR INTERVAL: 206 MS
QRS AXIS: 23 DEGREES
QRSD INTERVAL: 78 MS
QT INTERVAL: 402 MS
QTC INTERVAL: 408 MS
T WAVE AXIS: -61 DEGREES
VENTRICULAR RATE: 62 BPM

## 2018-04-19 PROCEDURE — 93010 ELECTROCARDIOGRAM REPORT: CPT | Performed by: INTERNAL MEDICINE

## 2018-04-19 NOTE — DISCHARGE INSTRUCTIONS
Abdominal Pain   WHAT YOU NEED TO KNOW:   Abdominal pain can be dull, achy, or sharp  You may have pain in one area of your abdomen, or in your entire abdomen  Your pain may be caused by a condition such as constipation, food sensitivity or poisoning, infection, or a blockage  Abdominal pain can also be from a hernia, appendicitis, or an ulcer  Liver, gallbladder, or kidney conditions can also cause abdominal pain  The cause of your abdominal pain may be unknown  DISCHARGE INSTRUCTIONS:   Return to the emergency department if:   · You have new chest pain or shortness of breath  · You have pulsing pain in your upper abdomen or lower back that suddenly becomes constant  · Your pain is in the right lower abdominal area and worsens with movement  · You have a fever over 100 4°F (38°C) or shaking chills  · You are vomiting and cannot keep food or liquids down  · Your pain does not improve or gets worse over the next 8 to 12 hours  · You see blood in your vomit or bowel movements, or they look black and tarry  · Your skin or the whites of your eyes turn yellow  · You are a woman and have a large amount of vaginal bleeding that is not your monthly period  Contact your healthcare provider if:   · You have pain in your lower back  · You are a man and have pain in your testicles  · You have pain when you urinate  · You have questions or concerns about your condition or care  Follow up with your healthcare provider within 24 hours or as directed:  Write down your questions so you remember to ask them during your visits  Medicines:   · Medicines  may be given to calm your stomach and prevent vomiting or to decrease pain  Ask how to take pain medicine safely  · Take your medicine as directed  Contact your healthcare provider if you think your medicine is not helping or if you have side effects  Tell him of her if you are allergic to any medicine   Keep a list of the medicines, vitamins, and herbs you take  Include the amounts, and when and why you take them  Bring the list or the pill bottles to follow-up visits  Carry your medicine list with you in case of an emergency  © 2017 [address] [employer]  Information is for End User's use only and may not be sold, redistributed or otherwise used for commercial purposes  All illustrations and images included in CareNotes® are the copyrighted property of A D [employer] M , [employer]  or [employer]  The above information is an  only  It is not intended as medical advice for individual conditions or treatments  Talk to your doctor, nurse or pharmacist before following any medical regimen to see if it is safe and effective for you  Gastritis   WHAT YOU NEED TO KNOW:   Gastritis is inflammation or irritation of the lining of your stomach  DISCHARGE INSTRUCTIONS:   Call 911 for any of the following:   · You develop chest pain or shortness of breath  Return to the emergency department if:   · You vomit blood  · You have black or bloody bowel movements  · You have severe stomach or back pain  Contact your healthcare provider if:   · You have a fever  · You have new or worsening symptoms, even after treatment  · You have questions or concerns about your condition or care  Medicines:   · Medicines  may be given to help treat a bacterial infection or decrease stomach acid  · Take your medicine as directed  Contact your healthcare provider if you think your medicine is not helping or if you have side effects  Tell him or her if you are allergic to any medicine  Keep a list of the medicines, vitamins, and herbs you take  Include the amounts, and when and why you take them  Bring the list or the pill bottles to follow-up visits  Carry your medicine list with you in case of an emergency  Manage or prevent gastritis:   · Do not smoke    Nicotine and other chemicals in cigarettes and cigars can make your symptoms worse and cause lung damage  Ask your healthcare provider for information if you currently smoke and need help to quit  E-cigarettes or smokeless tobacco still contain nicotine  Talk to your healthcare provider before you use these products  · Do not drink alcohol  Alcohol can prevent healing and make your gastritis worse  Talk to your healthcare provider if you need help to stop drinking  · Do not take NSAIDs or aspirin unless directed  These and similar medicines can cause irritation  If your healthcare provider says it is okay to take NSAIDs, take them with food  · Do not eat foods that cause irritation  Foods such as oranges and salsa can cause burning or pain  Eat a variety of healthy foods  Examples include fruits (not citrus), vegetables, low-fat dairy products, beans, whole-grain breads, and lean meats and fish  Try to eat small meals, and drink water with your meals  Do not eat for at least 3 hours before you go to bed  · Find ways to relax and decrease stress  Stress can increase stomach acid and make gastritis worse  Activities such as yoga, meditation, or listening to music can help you relax  Spend time with friends, or do things you enjoy  Follow up with your healthcare provider as directed: You may need ongoing tests or treatment, or referral to a gastroenterologist  Write down your questions so you remember to ask them during your visits  © 2017 [address] Information is for End User's use only and may not be sold, redistributed or otherwise used for commercial purposes  All illustrations and images included in CareNotes® are the copyrighted property of A D A M , Inc  or [employer]  The above information is an  only  It is not intended as medical advice for individual conditions or treatments  Talk to your doctor, nurse or pharmacist before following any medical regimen to see if it is safe and effective for you

## 2018-04-20 ENCOUNTER — TELEPHONE (OUTPATIENT)
Dept: INTERNAL MEDICINE CLINIC | Facility: CLINIC | Age: 58
End: 2018-04-20

## 2018-04-20 NOTE — TELEPHONE ENCOUNTER
Pt called stating she was in the ER on Wednesday 4/18 for stomach issues and she is asking if Dr Nirali Ontiveros can give her a call when she has a moment  Her phone number is [phone]

## 2018-04-23 ENCOUNTER — OFFICE VISIT (OUTPATIENT)
Dept: INTERNAL MEDICINE CLINIC | Facility: CLINIC | Age: 58
End: 2018-04-23
Payer: COMMERCIAL

## 2018-04-23 VITALS
BODY MASS INDEX: 25.27 KG/M2 | SYSTOLIC BLOOD PRESSURE: 106 MMHG | OXYGEN SATURATION: 97 % | DIASTOLIC BLOOD PRESSURE: 70 MMHG | TEMPERATURE: 98.1 F | HEIGHT: 63 IN | WEIGHT: 142.6 LBS | HEART RATE: 83 BPM | RESPIRATION RATE: 16 BRPM

## 2018-04-23 DIAGNOSIS — R10.13 DYSPEPSIA: Primary | ICD-10-CM

## 2018-04-23 DIAGNOSIS — K59.09 OTHER CONSTIPATION: ICD-10-CM

## 2018-04-23 PROCEDURE — 3008F BODY MASS INDEX DOCD: CPT | Performed by: INTERNAL MEDICINE

## 2018-04-23 PROCEDURE — 99213 OFFICE O/P EST LOW 20 MIN: CPT | Performed by: INTERNAL MEDICINE

## 2018-04-23 RX ORDER — ERGOCALCIFEROL (VITAMIN D2) 10 MCG
1 TABLET ORAL DAILY
COMMUNITY

## 2018-04-23 NOTE — TELEPHONE ENCOUNTER
Patient called today very upset that she has not received a call from dr Adriana Joshi  I scheduled her to see dr Jessenia Mcgee this morning so she can have things addressed  Pt would still like a call from dr Adriana Joshi this afternoon

## 2018-04-23 NOTE — PROGRESS NOTES
Assessment/Plan:     Diagnoses and all orders for this visit:    Dyspepsia    Other constipation      Overall, Francia is doing better  At this point, I asked that she continue the Ranitidine twice daily for the next few days  We discussed avoiding NSAIDs and certain foods over the counter for now  She is going to monitor her bowel habits and let us know if it does not continue to improve  Of note, she did have a Cscope in 2016  Otherwise no other changes were made  Subjective:      Patient ID: Cheng Keen is a 62 y o  female  Vivian Graham is a pleasant woman that I am seeing today for an ED follow up  I spoke to her over the weekend as well and this is a summary of recent events  She reports feeling well until Wednesday  She reports eating new chili and thought this was possibly the cause of her problems  She started to notice abdominal pain/cramping along with nausea and vomiting  This led to the ED visit  Note was reviewed  She was given a script for Ranitidine as well as Bentyl  She repots still feeling off the next few days and developed constipation  She noted bloating and decreased appetite  She did not have fevers or any further nausea or vomiting  She was feeling uncomfortable secondary to the bloating and possible constipation  She had tried Gas X as well as Sennakot  I spoke to her on Saturday and she tried Miralax without relief as well  She finally reported relief after doing an enema and had a bowel movement  She has been passing gas  She reports that she is improving slowly but does feel that the stomach feels raw  Denies increased NSAID use or black/bloody bowel movements  She is here today for a close follow up           The following portions of the patient's history were reviewed and updated as appropriate: allergies, current medications, past family history, past medical history, past social history, past surgical history and problem list     Review of Systems   Constitutional: Positive for appetite change  Negative for chills, fatigue, fever and unexpected weight change  Respiratory: Negative for cough and shortness of breath  Cardiovascular: Negative for chest pain and palpitations  Gastrointestinal: Positive for abdominal pain and constipation  Negative for anal bleeding, blood in stool, diarrhea, nausea and vomiting  Genitourinary: Negative for dysuria and hematuria  Musculoskeletal: Positive for back pain  Neurological: Negative for syncope, light-headedness and headaches  Objective:      /70 (BP Location: Left arm, Patient Position: Sitting, Cuff Size: Adult)   Pulse 83   Temp 98 1 °F (36 7 °C) (Tympanic)   Resp 16   Ht 5' 2 5" (1 588 m)   Wt 64 7 kg (142 lb 9 6 oz)   SpO2 97%   BMI 25 67 kg/m²          Physical Exam   Constitutional: She is oriented to person, place, and time  She appears well-developed and well-nourished  Cardiovascular: Normal rate, regular rhythm and normal heart sounds  Pulmonary/Chest: Effort normal and breath sounds normal  No respiratory distress  She has no wheezes  Abdominal: Bowel sounds are normal  She exhibits no distension  There is no tenderness  There is no rebound and no guarding  Musculoskeletal: Normal range of motion  Neurological: She is alert and oriented to person, place, and time  Psychiatric: She has a normal mood and affect   Her behavior is normal  Judgment and thought content normal

## 2018-04-25 ENCOUNTER — TELEPHONE (OUTPATIENT)
Dept: INTERNAL MEDICINE CLINIC | Facility: CLINIC | Age: 58
End: 2018-04-25

## 2018-04-25 DIAGNOSIS — R10.13 DYSPEPSIA: Primary | ICD-10-CM

## 2018-04-25 NOTE — TELEPHONE ENCOUNTER
Francia called and she continues to have abdominal pain and bloating  I want to order some additional labs and schedule her for a abd u/s   I also am going to try and print out a FODMAP diet (things to avoid and things to eat) and see if this helps

## 2018-04-30 ENCOUNTER — HOSPITAL ENCOUNTER (OUTPATIENT)
Dept: ULTRASOUND IMAGING | Facility: MEDICAL CENTER | Age: 58
Discharge: HOME | End: 2018-04-30
Payer: COMMERCIAL

## 2018-04-30 ENCOUNTER — APPOINTMENT (OUTPATIENT)
Dept: LAB | Facility: MEDICAL CENTER | Age: 58
End: 2018-04-30
Payer: COMMERCIAL

## 2018-04-30 DIAGNOSIS — R10.13 DYSPEPSIA: ICD-10-CM

## 2018-04-30 LAB
ALBUMIN SERPL BCP-MCNC: 3.3 G/DL (ref 3.5–5)
ALP SERPL-CCNC: 80 U/L (ref 46–116)
ALT SERPL W P-5'-P-CCNC: 14 U/L (ref 12–78)
ANION GAP SERPL CALCULATED.3IONS-SCNC: 9 MMOL/L (ref 4–13)
AST SERPL W P-5'-P-CCNC: 8 U/L (ref 5–45)
BILIRUB SERPL-MCNC: 0.48 MG/DL (ref 0.2–1)
BUN SERPL-MCNC: 14 MG/DL (ref 5–25)
CALCIUM SERPL-MCNC: 9.2 MG/DL (ref 8.3–10.1)
CHLORIDE SERPL-SCNC: 102 MMOL/L (ref 100–108)
CO2 SERPL-SCNC: 28 MMOL/L (ref 21–32)
CREAT SERPL-MCNC: 1.24 MG/DL (ref 0.6–1.3)
GFR SERPL CREATININE-BSD FRML MDRD: 48 ML/MIN/1.73SQ M
GLUCOSE P FAST SERPL-MCNC: 154 MG/DL (ref 65–99)
LIPASE SERPL-CCNC: 80 U/L (ref 73–393)
POTASSIUM SERPL-SCNC: 4.3 MMOL/L (ref 3.5–5.3)
PROT SERPL-MCNC: 7.4 G/DL (ref 6.4–8.2)
SODIUM SERPL-SCNC: 139 MMOL/L (ref 136–145)

## 2018-04-30 PROCEDURE — 80053 COMPREHEN METABOLIC PANEL: CPT | Performed by: INTERNAL MEDICINE

## 2018-04-30 PROCEDURE — 36415 COLL VENOUS BLD VENIPUNCTURE: CPT | Performed by: INTERNAL MEDICINE

## 2018-04-30 PROCEDURE — 86677 HELICOBACTER PYLORI ANTIBODY: CPT

## 2018-04-30 PROCEDURE — 83690 ASSAY OF LIPASE: CPT

## 2018-04-30 PROCEDURE — 76705 ECHO EXAM OF ABDOMEN: CPT

## 2018-05-01 LAB — H PYLORI IGM SER-ACNC: <9 UNITS (ref 0–8.9)

## 2018-05-02 ENCOUNTER — TELEPHONE (OUTPATIENT)
Dept: INTERNAL MEDICINE CLINIC | Facility: CLINIC | Age: 58
End: 2018-05-02

## 2018-05-02 NOTE — PROGRESS NOTES
She could certainly see GI if she would like and we can put in a referral for Dr Love Thorne if she would like?

## 2018-06-22 DIAGNOSIS — E78.5 HYPERLIPIDEMIA, UNSPECIFIED HYPERLIPIDEMIA TYPE: Primary | ICD-10-CM

## 2018-06-22 RX ORDER — ATORVASTATIN CALCIUM 10 MG/1
TABLET, FILM COATED ORAL
Qty: 90 TABLET | Refills: 2 | Status: SHIPPED | OUTPATIENT
Start: 2018-06-22 | End: 2019-03-19 | Stop reason: SDUPTHER

## 2019-03-19 DIAGNOSIS — E78.5 HYPERLIPIDEMIA, UNSPECIFIED HYPERLIPIDEMIA TYPE: ICD-10-CM

## 2019-03-19 RX ORDER — ATORVASTATIN CALCIUM 10 MG/1
TABLET, FILM COATED ORAL
Qty: 90 TABLET | Refills: 2 | Status: SHIPPED | OUTPATIENT
Start: 2019-03-19

## 2019-03-29 DIAGNOSIS — E11.9 TYPE 2 DIABETES MELLITUS WITHOUT COMPLICATION, WITHOUT LONG-TERM CURRENT USE OF INSULIN (HCC): ICD-10-CM

## 2019-12-14 DIAGNOSIS — E78.5 HYPERLIPIDEMIA, UNSPECIFIED HYPERLIPIDEMIA TYPE: ICD-10-CM

## 2019-12-16 RX ORDER — ATORVASTATIN CALCIUM 10 MG/1
TABLET, FILM COATED ORAL
Qty: 90 TABLET | Refills: 4 | OUTPATIENT
Start: 2019-12-16

## 2024-04-01 ENCOUNTER — TRANSCRIBE ORDERS (OUTPATIENT)
Dept: LAB | Facility: CLINIC | Age: 64
End: 2024-04-01

## 2024-04-01 DIAGNOSIS — Z13.9 SCREENING FOR UNSPECIFIED CONDITION: Primary | ICD-10-CM

## 2024-04-01 DIAGNOSIS — Z13.9 SCREENING FOR UNSPECIFIED CONDITION: ICD-10-CM

## 2024-04-02 ENCOUNTER — APPOINTMENT (OUTPATIENT)
Dept: LAB | Facility: CLINIC | Age: 64
End: 2024-04-02

## 2024-04-02 LAB
ALBUMIN SERPL BCP-MCNC: 4 G/DL (ref 3.5–5)
ALP SERPL-CCNC: 51 U/L (ref 34–104)
ALT SERPL W P-5'-P-CCNC: 14 U/L (ref 7–52)
ANION GAP SERPL CALCULATED.3IONS-SCNC: 8 MMOL/L (ref 4–13)
AST SERPL W P-5'-P-CCNC: 17 U/L (ref 13–39)
BILIRUB SERPL-MCNC: 0.48 MG/DL (ref 0.2–1)
BUN SERPL-MCNC: 11 MG/DL (ref 5–25)
CALCIUM SERPL-MCNC: 8.9 MG/DL (ref 8.4–10.2)
CHLORIDE SERPL-SCNC: 104 MMOL/L (ref 96–108)
CHOLEST SERPL-MCNC: 158 MG/DL
CO2 SERPL-SCNC: 28 MMOL/L (ref 21–32)
CREAT SERPL-MCNC: 0.68 MG/DL (ref 0.6–1.3)
GFR SERPL CREATININE-BSD FRML MDRD: 93 ML/MIN/1.73SQ M
GLUCOSE P FAST SERPL-MCNC: 108 MG/DL (ref 65–99)
HDLC SERPL-MCNC: 74 MG/DL
LDLC SERPL CALC-MCNC: 71 MG/DL (ref 0–100)
NONHDLC SERPL-MCNC: 84 MG/DL
POTASSIUM SERPL-SCNC: 4.2 MMOL/L (ref 3.5–5.3)
PROT SERPL-MCNC: 6.7 G/DL (ref 6.4–8.4)
SODIUM SERPL-SCNC: 140 MMOL/L (ref 135–147)
TRIGL SERPL-MCNC: 65 MG/DL

## 2024-04-02 PROCEDURE — 80053 COMPREHEN METABOLIC PANEL: CPT

## 2024-04-02 PROCEDURE — 80061 LIPID PANEL: CPT

## 2024-10-28 ENCOUNTER — TELEPHONE (OUTPATIENT)
Dept: VASCULAR SURGERY | Facility: CLINIC | Age: 64
End: 2024-10-28

## 2024-10-28 NOTE — TELEPHONE ENCOUNTER
Spoke with pt and informed them that NOAM will not be in the office for their appt 10/30 and I was calling to reschedule. She stated this appt was for a 2nd opinion. Offered 10/29 with [facility], she stated she cannot do this day and only wanted to see NOAM but NOAM currently has no openings at either office. I looked at [place] [facility] [place] with an AP and there were only dates for early and late December and she stated she could not do anything towards the end of December because she [place] be out of [place] a year. She stated she did not want to reschedule the OV.